# Patient Record
Sex: FEMALE | Race: WHITE | NOT HISPANIC OR LATINO
[De-identification: names, ages, dates, MRNs, and addresses within clinical notes are randomized per-mention and may not be internally consistent; named-entity substitution may affect disease eponyms.]

---

## 2020-03-02 ENCOUNTER — TRANSCRIBE ORDERS (OUTPATIENT)
Dept: OTHER | Age: 70
End: 2020-03-02

## 2020-03-02 DIAGNOSIS — C44.622 SQUAMOUS CELL CARCINOMA OF RIGHT UPPER EXTREMITY: Primary | ICD-10-CM

## 2020-03-11 ENCOUNTER — TELEPHONE (OUTPATIENT)
Dept: DERMATOLOGY | Facility: CLINIC | Age: 70
End: 2020-03-11

## 2020-03-11 NOTE — TELEPHONE ENCOUNTER
Messages were left 3/3/20, 3/5/20, 3/9/20, and 3/10/20.     A call was made on 3/10/20 to the referring physician's office to let him know that the patient has not called to make the appointment.     Judith Menjivar  Periop Derm/Surg   [phone]

## 2020-05-28 ENCOUNTER — TRANSFERRED RECORDS (OUTPATIENT)
Dept: HEALTH INFORMATION MANAGEMENT | Facility: CLINIC | Age: 70
End: 2020-05-28

## 2020-06-02 ENCOUNTER — TRANSFERRED RECORDS (OUTPATIENT)
Dept: HEALTH INFORMATION MANAGEMENT | Facility: CLINIC | Age: 70
End: 2020-06-02

## 2020-06-10 ENCOUNTER — MEDICAL CORRESPONDENCE (OUTPATIENT)
Dept: HEALTH INFORMATION MANAGEMENT | Facility: CLINIC | Age: 70
End: 2020-06-10

## 2020-06-12 ENCOUNTER — TRANSFERRED RECORDS (OUTPATIENT)
Dept: HEALTH INFORMATION MANAGEMENT | Facility: CLINIC | Age: 70
End: 2020-06-12

## 2020-06-16 NOTE — TELEPHONE ENCOUNTER
Left another message for the patient to call and get her consult for derm surg. My desk number was left for her to call back at [phone].    Thank you!!    Judith Menjivar  Periop Derm/Surg   [phone]

## 2020-06-23 NOTE — TELEPHONE ENCOUNTER
Action 06/23/20 8:53 AM - Lexi   Action Taken  Pathology received from [facility] and sent to be reviewed with filled out pathology form.    6/2/20 - Left Knee and Right Forearm skin biopsy (Case: [id number])

## 2020-06-23 NOTE — TELEPHONE ENCOUNTER
Left a voicemail for Janell to call back to discuss scheduling as we are currently doing virtual visits. Clinic number provided to call back to discuss.

## 2020-06-23 NOTE — TELEPHONE ENCOUNTER
[facility] Call Center    Phone Message    May a detailed message be left on voicemail: yes     Reason for Call: Other: Patient is calling in and wanting to schedule a new patient appointment for a skin check  she would like to be seen in July she has a history of squamous cell carcinoma she does not want a biopsy just a skin check. Please follow up with the patient to schedule sometime in July. Thank you.    Action Taken: Message routed to:  [facility] derm    Travel Screening: Not Applicable

## 2020-06-24 ENCOUNTER — APPOINTMENT (OUTPATIENT)
Dept: LAB | Facility: CLINIC | Age: 70
End: 2020-06-24
Payer: COMMERCIAL

## 2020-06-26 ENCOUNTER — TELEPHONE (OUTPATIENT)
Dept: DERMATOLOGY | Facility: CLINIC | Age: 70
End: 2020-06-26

## 2020-06-26 NOTE — TELEPHONE ENCOUNTER
[facility] Call Center    Phone Message    May a detailed message be left on voicemail: yes     Reason for Call: Other: Pt needs clinic review for appt for squamous cell carcinoma.      Action Taken: Message routed to:  [facility] Derm    Travel Screening: Not Applicable

## 2020-06-29 ENCOUNTER — TELEPHONE (OUTPATIENT)
Dept: CARDIOLOGY | Facility: CLINIC | Age: 70
End: 2020-06-29

## 2020-06-29 PROCEDURE — 00000346 ZZHCL STATISTIC REVIEW OUTSIDE SLIDES TC 88321: Performed by: DERMATOLOGY

## 2020-06-29 NOTE — TELEPHONE ENCOUNTER
The patient confirmed that she's having her SCC removed on 6/30 locally. She indicated that she wanted to come to [facility] to become an established patient. She states that [facility] was given excellent care including skin checks with blacklights, etc. She is hoping for that same type of care here.     She indicated that she's booked for a skin check locally on Sept 29th at which time I told her that her chart indicated that she has an appointment at the [address] [place] [address] on Sept 9th (she seemed surprised by this but is willing to come to the appointment).    I assisted her with a [facility] link so that she can have easier access to her appointments, the addresses and the ability to communicate with the care teams.     She was satisfied with this plan and has my call back number if she has any further questions.     Judith Menjivar  Periop Derm/Surg   [phone]

## 2020-06-30 LAB — COPATH REPORT: NORMAL

## 2021-01-04 ENCOUNTER — HEALTH MAINTENANCE LETTER (OUTPATIENT)
Age: 71
End: 2021-01-04

## 2021-10-10 ENCOUNTER — HEALTH MAINTENANCE LETTER (OUTPATIENT)
Age: 71
End: 2021-10-10

## 2022-01-30 ENCOUNTER — HEALTH MAINTENANCE LETTER (OUTPATIENT)
Age: 72
End: 2022-01-30

## 2022-09-18 ENCOUNTER — HEALTH MAINTENANCE LETTER (OUTPATIENT)
Age: 72
End: 2022-09-18

## 2023-01-29 ENCOUNTER — HEALTH MAINTENANCE LETTER (OUTPATIENT)
Age: 73
End: 2023-01-29

## 2023-05-08 ENCOUNTER — HEALTH MAINTENANCE LETTER (OUTPATIENT)
Age: 73
End: 2023-05-08